# Patient Record
Sex: FEMALE | Race: WHITE | Employment: OTHER | URBAN - METROPOLITAN AREA
[De-identification: names, ages, dates, MRNs, and addresses within clinical notes are randomized per-mention and may not be internally consistent; named-entity substitution may affect disease eponyms.]

---

## 2017-09-07 ENCOUNTER — OP HISTORICAL/CONVERTED ENCOUNTER (OUTPATIENT)
Dept: OTHER | Age: 62
End: 2017-09-07

## 2018-10-26 ENCOUNTER — OP HISTORICAL/CONVERTED ENCOUNTER (OUTPATIENT)
Dept: OTHER | Age: 63
End: 2018-10-26

## 2019-10-28 ENCOUNTER — OP HISTORICAL/CONVERTED ENCOUNTER (OUTPATIENT)
Dept: OTHER | Age: 64
End: 2019-10-28

## 2020-06-03 ENCOUNTER — TELEPHONE (OUTPATIENT)
Dept: FAMILY MEDICINE CLINIC | Age: 65
End: 2020-06-03

## 2020-06-03 NOTE — TELEPHONE ENCOUNTER
----- Message from Nancy Tierney sent at 6/3/2020 12:02 PM EDT -----  Regarding: Pat/Telephone  General Message/Vendor Calls    Caller's first and last name:Camila Lewis      Reason for call:tick bite in her forearm      Callback required yes/no and why:yes      Best contact number(s):[phone]      Details to clarify the request:Patient used to see Dr. Michael Moreno but it has been more than 3 years and she has a tick bite she wants to know if she can do the virtual visit instead for a New Patient as soon as possible      Nancy Tierney

## 2020-06-03 NOTE — TELEPHONE ENCOUNTER
Received staff message. Called to schedule next NP appt in office b/c NP can not do VV. Kept calling number but it would not ring and no voicemail.

## 2020-08-20 ENCOUNTER — OP HISTORICAL/CONVERTED ENCOUNTER (OUTPATIENT)
Dept: OTHER | Age: 65
End: 2020-08-20

## 2020-09-21 ENCOUNTER — OFFICE VISIT (OUTPATIENT)
Dept: SURGERY | Age: 65
End: 2020-09-21
Payer: MEDICARE

## 2020-09-21 VITALS
HEART RATE: 80 BPM | TEMPERATURE: 96.9 F | HEIGHT: 68 IN | WEIGHT: 193 LBS | OXYGEN SATURATION: 98 % | BODY MASS INDEX: 29.25 KG/M2 | SYSTOLIC BLOOD PRESSURE: 135 MMHG | DIASTOLIC BLOOD PRESSURE: 99 MMHG

## 2020-09-21 DIAGNOSIS — I80.01 PHLEBITIS AND THOMBOPHLB OF SUPERFIC VESSELS OF R LOW EXTREM: Primary | ICD-10-CM

## 2020-09-21 PROCEDURE — 99203 OFFICE O/P NEW LOW 30 MIN: CPT | Performed by: SURGERY

## 2020-09-21 RX ORDER — LEVOTHYROXINE SODIUM 100 UG/1
TABLET ORAL
COMMUNITY
Start: 2020-08-14

## 2020-09-21 NOTE — PROGRESS NOTES
VASCULAR FOLLOW UP      Subjective:   CHIEF COMPLAINTS:  Thrombophlebitis of right leg. PRESENTATION OF ILLNESS:  Melanie Daniel is a very pleasant 77years old with a .  She is otherwise very active. She noticed swelling and blood clots from ruptured vein from the right leg since August 2020. Pain is minimal.  However she noticed a hard knot at below the knee area. She is here for consultation. Patient wears a compression stocking but she is not sure what compression measurement she has. Patient denies any fever chills. Patient recently underwent ultrasonogram venous system which showed no deep vein thrombosis. Patient has been applying warm salt compresses as well. Past Medical History:   Diagnosis Date    Cancer [facility]) 1999    thyroid    Osteopenia     Thyroid disease     Varicose vein of leg       Past Surgical History:   Procedure Laterality Date    HX CATARACT REMOVAL  2007    HX COLONOSCOPY      HX THYROIDECTOMY  1999     Family History   Problem Relation Age of Onset    Cancer Mother         breast    Thyroid Disease Mother     Heart Disease Father       Social History     Tobacco Use    Smoking status: Never Smoker    Smokeless tobacco: Never Used   Substance Use Topics    Alcohol use: Yes     Alcohol/week: 1.0 standard drinks     Types: 1 Glasses of wine per week     Frequency: Monthly or less     Drinks per session: 1 or 2     Binge frequency: Never       Prior to Admission medications    Medication Sig Start Date End Date Taking?  Authorizing Provider   LevoxyL 100 mcg tablet TAKE 1 TABLET BY MOUTH ONCE DAILY IN THE MORNING ON AN EMPTY STOMACH FOR 90 DAYS 8/14/20   Provider, Historical     No Known Allergies     Review of Systems:  I reviewed the rest of organ systems personally and they were negative signed by Dr. Lucina Samaniego    Objective:     Visit Vitals  BP (!) 135/99 (BP 1 Location: Right arm, BP Patient Position: Sitting)   Pulse 80   Temp 96.9 °F (36.1 °C)   Ht 5' 8\" (1.727 m)   Wt 193 lb (87.5 kg)   SpO2 98%   BMI 29.35 kg/m²     VITAL SIGNS REVIEWED. Physical Exam:  Patient is well-nourished pleasant in conversation is appropriate. Head and neck examination atraumatic, normocephalic. Gaze appropriate. Conversation appropriate. Neck examination shows supple. No mass. No obvious carotid bruit. Chest examination shows lungs are clear bilaterally well-expanded, no crackles or wheezes. Cardiovascular system regular rate, no obvious murmur. Skin warm to touch  and moist, no skin lesions. Abdomen is soft ,not tender or distended bowel sounds present. No palpable mass. Neurological examinations, no focal neuro deficits moving all 4 extremities. Cranial nerves intact. Sensation is intact as well. Hematologic: No obvious bruise or swelling or obvious lymphadenopathy. Psychosocial: Appropriate. Has good effect. Musculoskeletal system: No muscle wasting, appropriate movements upper and lower extremity. Vascular examination: Patient has a varicose vein worse on the right leg and left side. Swelling is moderate. She has a CEAP C3 classification. Data Review:   No visits with results within 1 Month(s) from this visit. Latest known visit with results is:   Abstract on 04/02/2010   Component Date Value Ref Range Status    Mammography, External 02/01/2010 NORMAL   Final        Assessment:     Problem List Items Addressed This Visit        Circulatory    Phlebitis and thombophlb of superfic vessels of r low extrem - Primary              Plan:     I discussed with the patient details about phlebitis and thrombophlebitis of the legs. We talked about her stage of vein conditions CEAP C3 classification. We talked about conservative management of the phlebitis including warm soak compress therapy, ibuprofen therapy, also appropriate fitting compression stocking with a pressure system of 20 to 30 mmHg.   Patient will go to lymphedema clinic and Alicia Perea have this stocking fitted and custom-made. And I would like to see her again follow-up in 3 months.         Keily Reyes MD

## 2020-10-23 ENCOUNTER — TRANSCRIBE ORDER (OUTPATIENT)
Dept: SCHEDULING | Age: 65
End: 2020-10-23

## 2020-10-23 DIAGNOSIS — Z12.31 SCREENING MAMMOGRAM FOR HIGH-RISK PATIENT: Primary | ICD-10-CM

## 2020-11-19 ENCOUNTER — HOSPITAL ENCOUNTER (OUTPATIENT)
Dept: MAMMOGRAPHY | Age: 65
Discharge: HOME | End: 2020-11-19
Attending: FAMILY MEDICINE
Payer: MEDICARE

## 2020-11-19 DIAGNOSIS — Z12.31 SCREENING MAMMOGRAM FOR HIGH-RISK PATIENT: ICD-10-CM

## 2020-11-19 PROCEDURE — 77067 SCR MAMMO BI INCL CAD: CPT

## 2020-12-01 ENCOUNTER — HOSPITAL ENCOUNTER (OUTPATIENT)
Dept: PHYSICAL THERAPY | Age: 65
Discharge: HOME | End: 2020-12-01
Payer: MEDICARE

## 2020-12-01 PROCEDURE — 97535 SELF CARE MNGMENT TRAINING: CPT

## 2020-12-01 PROCEDURE — 97140 MANUAL THERAPY 1/> REGIONS: CPT

## 2020-12-01 PROCEDURE — 97161 PT EVAL LOW COMPLEX 20 MIN: CPT

## 2020-12-01 NOTE — PROGRESS NOTES
[address] [facility]  [address] [facility]  [address]      INITIAL EVALUATION    NAME: Shara Oreilly AGE: 77 y.o. GENDER: female  DATE: 12/1/2020  REFERRING PHYSICIAN: Ge Price MD  HISTORY AND BACKGROUND:  Patient referred to clinic for evaluation and treatment of LE lymphedema with varicose veins R LE. Patient reports family history of LE swelling, including her mother and brother. Patient reports her R LE has been larger than L LE most of her life. Patient reports \"knot\" developed anterior lower leg right, which appears to have resolved. Patient reports doppler study performed, r/o DVT. See further medical history as noted below. Primary Diagnosis:  · Primary lymphedema (Q82.0)  Other Treatment Diagnoses:   Swelling not relieved by elevation ( R60.9 edema)  Morbid Obesity (E66.01)  Date of Onset: early adulthood, thrombophlebitis anterior lower leg right? 8/2020  Present Symptoms and Functional Limitations: R LE larger than L LE, mild negative impact on body image, socializing with others, understanding of how to manage lymphedema. Lymphedema Life Impact Scale: 4/68  Past Medical History:   Past Medical History:   Diagnosis Date    Cancer [facility]) 1999    thyroid    Osteopenia     Thyroid disease     Varicose vein of leg      Past Surgical History:   Procedure Laterality Date    HX CATARACT REMOVAL  2007    HX COLONOSCOPY      HX THYROIDECTOMY  1999     Current Medications:    Current Outpatient Medications   Medication Sig    LevoxyL 100 mcg tablet TAKE 1 TABLET BY MOUTH ONCE DAILY IN THE MORNING ON AN EMPTY STOMACH FOR 90 DAYS     No current facility-administered medications for this encounter.       Allergies: No Known Allergies   Prior Level of Function/Social/Work History/Personal factors and/or comorbidities impacting plan of care: Patient reports long history of R LE being larger than L LE, however, developed \"knot\" anterior lower leg 8/2020, which has resolved. Retired principal.  Lives 1 hour from clinic. Thyroidectomy for management of thyroid cancer. Patient rides and cares for horses. Living Situation: private residence      Trainable Caregiver?: spouse has COPD. Patient will need to be independent with management of lymphedema. Self-care/ADLs: indep     Mobility: indep   Sleeping Arrangement:  bed   Adaptive Equipment Owned: na  Previous Therapy:  Limited wear of OTC compression sleeve R LE, footless. Compression/Lymphedema Equipment:  Footless OTC compression sleeve. SUBJECTIVE:   Patient complains of R LE being larger than L LE for most of her life. States she developed a knot under the skin R anterior lower leg, which has resolved. However, understands she needs to have compression stocking ordered and fit R LE. Patient reports family history of lymphedema, mother and brother. Patient is active, riding and caring for horses.    Patients goals for therapy: reduce size of R LE.    OBJECTIVE DATA SUMMARY:   EXAMINATION/PRESENTATION/DECISION MAKING:   Pain: 0/10             Skin and Tissue Assessment:  Dermal Status:LE  [x]   Intact [x]  Dry   []  Tenuous [] Flaky   []  Wound/lesion present []  Scars   []  Dermatitis    Texture/Consistency:  [x]  Boggy: bilateral LE, R>L []  Pitting Edema   []  Brawny []  Combination   [x]  Fibrotic/Woody: medial distal lower leg    Pigmentation/Color Change:  []  Normal []  Hemosiderin   []  Red []  Erythematous   []  Hyperpigmented []  Hyperlipodermatosclerosis   Anomalies: na  []  Lymphorrhea []  Vesicles   []  Petechiae []  Warty Vercusis   []  Bullae []  Papilloma   Circulatory:  []  BHAVNA [x]  Varicosities: R LE   [x]  Pulses: dorsal pedal pulses palpable []  Vascular studies ruled out DVT in past   []  DVT History    Nails:  [x]   Normal  []   Fungus  Stemmers Sign: normal  Photos:    Height:   5'7\"  Weight:    190 lbs   BMI:   29.8  (36 or greater: adversely affecting lymphedema)  Volumetric Measurements:   Right:  9493.71 mL Left:  9111.77 mL   % Difference: 4.19%    (See scanned graph)  Range of Motion: bilateral LE WNL AROM. Strength: bilateral LE 5/5 with MMT. Sensation:  intact  Mobility:  Bed/Chair Mobility:  Independent  Transfers:  Independent    Sitting Balance:  good Standing Balance:  good   Gait:  Independent  Wheelchair Mobility:  (Other) na   Endurance:  Good, patient active, gait community distances. Stairs: not assessed          Safety:  Patient is alert and oriented:  x4   Safety awareness:  intact   Fall Risk?:  low   Patient given written fall prevention handout: Yes   Precautions:  Standard lymphedema precautions to include avoiding blood pressure readings, injections and IVs or other procedures/acts that could lead to broken skin on affected area, and avoiding excessive heat, resistive activity or altitude without compression garment    Functional Measure:   LLIS      Physical Therapy Evaluation Charge Determination   History Examination Presentation Decision-Making   MEDIUM  Complexity : 1-2 comorbidities / personal factors will impact the outcome/ POC  MEDIUM Complexity : 3 Standardized tests and measures addressing body structure, function, activity limitation and / or participation in recreation  MEDIUM Complexity : Evolving with changing characteristics  Other outcome measures LLIS  LOW       Based on the above components, the patient evaluation is determined to be of the following complexity level: LOW     Evaluation Time: 8:40-8:58 am     18 minutes    TREATMENT PROVIDED:   1. Treatment description: The patient was educated regarding the role and function of the lymphatic system, pathophysiology of lymphedema, and instructed in the lymphedema management protocol of complete decongestive therapy (CDT).   This includes skin care to prevent breakdown or infection, lymphedema exercises, custom compression therapy options (bandaging, compression garments, compression pump, Augustine Nova, JoViPak, [employer] Aniket-Jesse, etc. as needed), and decongestion with manual lymphatic drainage as indicated. We discussed the need for consistent compression system for lymphedema management. Skin care education was performed,applying low pH lotion to extremity using upward strokes to stimulate lymphatic vessels. Treatment time:  8:58-9:24 am  Minutes: 26 minutes                Self care 2    2. Treatment description:  Measurements completed for custom flat knit compression stockings, Juzo 3022, knee high R LE. Patient agreeable with plan to order compression stockings through [facility], with patient to return to clinic for fit of compression stocking upon receiving. Treatment time:  9:24-9:40 am  Minutes: 16               Manual therapy 1    ASSESSMENT:   Celso Zapien is a 77 y.o. female who presents with stage II mild lymphedema R LE, varicosities significant. Left LE likely involved, however, plan to treat R LE at this time. Patient presents with 4.19% limb volume discrepancy, R LE> LE. Patient reports R LE \"has always been larger\" than L LE. Patient also reports family history of LE lymphedema, including her mother and brother. Plan to fit custom flat knit compression stocking R LE, educate patient regarding skin care, self MLD, and lymphedema specific exercise program upon effective fit of compression stocking R LE. This care is medically necessary due to the infection risk with lymphedema, and to improve functional activities. CDT is necessary to resolve swelling to allow patient to return to wearing normal clothes/footwear, and prevent worsening of symptoms, such as venous stasis ulcerations, infections, or hospitalizations.   Patient will be independent with home program strategies to allow improved ADL ability and mobility and to allow patient to return to greatest functional independence. Rehabilitation potential is considered to be Good. Factors which may influence rehabilitation potential include patient very active, with CCL 2 stocking recommended for optimal management of R LE lymphedema. Patient will benefit from 2-6 physical therapy visits over 12 weeks to optimize improvement in these areas. PLAN OF CARE:   Recommendations and Planned Interventions:  Manual lymph drainage/complete decongestive therapy  Multi-layer compression bandaging (short-stretch)  Compression garment fitting/provision  Lymphedema therapeutic exercise  AROM/PROM/Strength/Coordination  Self-care training  Functional mobility training  Education in skin care and lymphedema precautions  Self-MLD education per home program  Self-bandaging education per home program  Caregiver education as needed  Wound care as needed     GOALS  Short term goals  Time frame: 4-6 weeks    1. Patient to be independent with proper skin care to prevent future skin breakdown and decrease the potential risk for infections that are associated with Lymphedema. 1. Patient will be independent with a personal lymphedema exercise program to assist with the lymphatic flow and reduce limb volume by .  2. Patient will understand the signs and symptoms of acute infection. Long term goals  Time frame: 6-12 weeks  1. Patient will have knowledge of the compression options and acquire a safe and  appropriate daytime and night time compression system to prevent  re-accumulation of fluid. 2.  Patient or family will be able to don/doff garments independently. Garment  system effectiveness will be evaluated prior to discharge for better long-term  management and outcomes. 3.  Improvement in functional outcomes measure by 10% to allow for overall improvement in mobility with reduced pain. 4.   To transition patient to independent restorative phase of CDT.           Patient has participated in goal setting and agrees to work toward plan of care.  Patient was instructed to call if questions or concerns arise. Thank you for this referral.  Sil German PT, [facility]   Time Calculation: 60 mins    TREATMENT PLAN EFFECTIVE DATES:   12/1/2020 TO 2/28/2021  I have read the above plan of care for Malgorzata Quiñones. I certify the above prescribed services are required by this patient and are medically necessary.   The above plan of care has been developed in conjunction with the lymphedema/physical therapist.       Physician Signature: ____________________________________Date:______________

## 2020-12-17 ENCOUNTER — HOSPITAL ENCOUNTER (OUTPATIENT)
Dept: PHYSICAL THERAPY | Age: 65
Discharge: HOME | End: 2020-12-17
Payer: MEDICARE

## 2020-12-17 PROCEDURE — 97760 ORTHOTIC MGMT&TRAING 1ST ENC: CPT

## 2020-12-17 PROCEDURE — 97110 THERAPEUTIC EXERCISES: CPT

## 2020-12-17 NOTE — PROGRESS NOTES
[facility]  Lymphedema Clinic and Cancer Rehabilitation  [address] [place] [address]  [address]  [place]        LYmphedema Therapy  Visit: 2    [x]                  Daily note               []                 30 day/10th visit progress note    NAME: Jeffrey Gray  DATE: 12/17/2020    GOALS  Short term goals  Time frame: 4-6 weeks     1. Patient to be independent with proper skin care to prevent future skin breakdown and decrease the potential risk for infections that are associated with Lymphedema. 2.Patient will be independent with a personal lymphedema exercise program to assist with the lymphatic flow. 3.Patient will understand the signs and symptoms of acute infection. 12/17/2020 educated and provided written s/s to patient. Long term goals  Time frame: 6-12 weeks  1. Patient will have knowledge of the compression options and acquire a safe and       appropriate daytime and night time compression system to prevent             re-accumulation of fluid. 12/17/2020 goal met R LE.  2.  Patient or family will be able to don/doff garments independently. Garment   system effectiveness will be evaluated prior to discharge for better long-term  management and outcomes. 12/17/2020 Fit custom flat knit knee high compression stocking with good fit noted. Patient donned/doffed indep in clinic. 3.  Improvement in functional outcomes measure by 10% to allow for overall improvement in mobility with reduced pain. 4.   To transition patient to independent restorative phase of CDT. SUBJECTIVE REPORT:  Patient arrives stating she has received her custom flat knit compression stocking for R LE. Agreeable to proceeding with fit of stocking   Pain: 0/10                                Gait:   -  Independent gait without any assistive device for community distances  ADLs:  indep  Treatment Response:  Good fit of R LE knee high compression stocking.   [x] Patient reviewed packet received at evaluation  [x]   Patient completed home program as prescribed  []   Patient not fully compliant with home program to date  []   Patient waiting on compression garment arrival  Function:   []   Patient requiring less assistance with completion of home program  []   ADLs are requiring less assistance   []   Patient able to return to work/leisure activities  TREATMENT AND OBJECTIVE DATA SUMMARY:   Patient/Family Education:      Educated in skin care: [x]   Skin care products  [x]   Hygiene  [x]  Prevention of cellulitis  []   Wound care     Educated in exercise: []   Walking program  []   Vangie ball routine  []   Stick routine  [x]   ROM routine     Instructed in self MLD:   Written sequence given and reviewed with patient as well as demonstration and instruction during MLD portion of the session. Instructed in don/doff of compression system:   []   Multi layer bandage (MLB) donning principles and wear precautions  [x]   Day garments  []   Night garments       Therapeutic Exercise/Procedure: 9:05-9:15 am 10 minutes             Lymphedema specific exercise: Patient performs the following with assist of therapist, 10 reps with compression stocking on:  1. Toe flex/ext  2. Toe abd/add  3. Ankle pumps  4. Ankle circumduction both directions  5. Toe raises supported at counter top    Patient provided written home exercise program as noted above, continuing 10 reps, 3x/day with compression in place R LE. Patient verbalizes good understanding.    Home program: Patient to perform daily to BID:  [x]   Skin care  [x]   Deep abdominal breathing  [x]   Exercise routine  []   Walking program  []   Rest in supine   []   Compression bandage  [x]   Compression garments  []   Vasopneumatic device  []   Wound care  []   Self MLD  [x]   Bring supplies to each therapy visit  []   Purchase necessary supplies  []   Weight loss program  []   Follow up with       Rationale: Exercise will increase the lymph angiomotoricity and tissue pressure of the skin and thus decrease swelling. Orthotic management: 8:45-9:04 am 19 minutes     Area to decongest:    [] UE          []  Right     []  Left      [] Trunk      [x] LE             [x]  Right     []  Left      [] Trunk         Sequence used and effectiveness: [] Secondary/Primary sequence for upper extremity with / without trunk involvement    [] Secondary/Primary sequence for lower extremities with / without trunk involvement     [] Modifications were made to manual lymph drainage sequence to exclude cervical techniques secondary to patient's age    [x] Self MLD sequence/techniques/hand strokes   Skin/wound care/debridement: Skin intact. Lower extremity compression: Therapist fit R LE compression stocking, Pratik 3022 knee high , CT, SB. Note good fit of stocking, with patient instructed in don/doff of garment. Patient able to doff/don stocking in clinic. Advised  gloves to be used to don/doff of stocking to avoid picking/running fabric. Patient provided with the following home management:    Compression garment routine:  1. Apply daytime compression stocking to clean, dry extremity first thing in the morning, EVERY MORNING. Progressive wear schedule may be indicated based on tolerance. If needed, begin wear of stocking 2 hours today, adding one hour each day until tolerating all day wear. 2. Wear compression garments until bedtime, adjusting throughout the day as needed should garment wrinkle or crease. Problem areas can be at joints, and top of garment, ensuring proximal aspect of garment positioned appropriately on extremity. 3. Launder garment nightly either by hand or washing machine in a garment bag or pillowcase. Use mild detergent with NO FABRIC SOFTENER, NO BLEACH, NO WOOLITE. Wash in cool/warm water. 4. Dry garment in dryer on low heat right side out in garment bag or pillowcase.   5. Perform skin care to extremity, cleansing skin daily with soap and water, and using low pH lotion, upward strokes to stimulate lymphatic vessels. 6. Daytime compression grments are NOT safe to sleep in, so remove at bedtime. 7. Perform exercise program with compression garments on. Signs/Symptoms of infection include:  Fever, flu-like symptoms, pain/redness/warmthin extremity, sometimes with increase in swelling present. Stop wearing your compression garment if this occurs. Call your doctor immediately or go to the Emergency room to address potential infection. Remove compression with changes in circulation, numbness in extremity different from what you may experience when not wearing compression garment, pain, unexplained shortness of breath. Notify clinic if swelling increase noted prior to next scheduled appt. Night time compression may be needed for optimal management of lymphedema. Return in 2-4  weeks for follow up appt. Kinesiotaping: na   Girth/Volume measurement: Girth measurements as noted below. TOTAL TREATMENT 29 mins     Circumferential Limb Measurements:  Affected Side: Bilateral   Left (cm) Right (cm)   5th Tuberosity 23    23.3      Ankle 23.5    23.7      Calf 40.4    42      Mid Knee             Thigh             Groin             Length                 ASSESSMENT:   Treatment effectiveness and tolerance: Note good fit of compression stocking in clinic, with patient verbalizing and demonstrating good understanding regarding wear/care and don/doff of stocking. Patient returns demonstrating of exercises performs in clinic, with written program provided. Skin intact. Progress toward goals: See above goals. PLAN OF CARE:   Changes to the plan of care: 1. Patient to continue skin care/self MLD nightly. 2. Patient to follow above instructions regarding compression stocking wear. 3.  Patient to return in 2-4 weeks   4. Patient to notify clinic of any concerns/questions prior to next scheduled appt.    Frequency: []  2 times a week  []  Weekly  [x]  Biweekly  [x]  Monthly           Gemini Pennington, PT, [facility]

## 2021-01-07 ENCOUNTER — HOSPITAL ENCOUNTER (OUTPATIENT)
Dept: PHYSICAL THERAPY | Age: 66
Discharge: HOME | End: 2021-01-07
Payer: MEDICARE

## 2021-01-07 PROCEDURE — 97116 GAIT TRAINING THERAPY: CPT

## 2021-01-07 NOTE — PROGRESS NOTES
[facility]  Lymphedema Clinic and Cancer Rehabilitation  [address] [place] [address]  [address]  [place] [address]        LYmphedema Therapy  Visit: 3    [x]                  Daily note               [x]                 Discharge    NAME: Connie Chacon  DATE: 1/7/2021    GOALS  Short term goals  Time frame: 4-6 weeks     1. Patient to be independent with proper skin care to prevent future skin breakdown and decrease the potential risk for infections that are associated with Lymphedema. 1/7/2021 goal met  2. Patient will be independent with a personal lymphedema exercise program to assist with the lymphatic flow. 1/7/2021 goal met  3. Patient will understand the signs and symptoms of acute infection. 12/17/2020 educated and provided written s/s to patient. 1/7/2021 goal met. Long term goals  Time frame: 6-12 weeks  1. Patient will have knowledge of the compression options and acquire a safe and       appropriate daytime and night time compression system to prevent             re-accumulation of fluid. 12/17/2020 goal met R LE.  2.  Patient or family will be able to don/doff garments independently. Garment   system effectiveness will be evaluated prior to discharge for better long-term  management and outcomes. 12/17/2020 Fit custom flat knit knee high compression stocking with good fit noted. Patient donned/doffed indep in clinic. 1/7/2021 goal met. 3.  Improvement in functional outcomes measure by 10% to allow for overall improvement in mobility with reduced pain. 1/7/2021 goal deferred. 4.   To transition patient to independent restorative phase of CDT. 1/7/2021 goal met       SUBJECTIVE REPORT:  Patient arrives stating she has received her custom flat knit compression stocking for R LE. State she is wearing stocking daily, morning until evening. Reports tolerating compression garment wear well.   Expresses that she wants to continue with knee high compression stocking on R LE only at this time. Pain: 0/10                                Gait:   -  Independent gait without any assistive device for community distances  ADLs:  indep  Treatment Response:  Good fit of R LE knee high compression stocking. [x]   Patient reviewed packet received at evaluation  [x]   Patient completed home program as prescribed  []   Patient not fully compliant with home program to date  []   Patient waiting on compression garment arrival  Function:   []   Patient requiring less assistance with completion of home program  []   ADLs are requiring less assistance   []   Patient able to return to work/leisure activities  LLIS: 5/68; 7% impairment. Consistent with outcomes measure at time of initial evaluation. TREATMENT AND OBJECTIVE DATA SUMMARY:   Patient/Family Education:      Educated in skin care: [x]   Skin care products  [x]   Hygiene  [x]  Prevention of cellulitis  []   Wound care     Educated in exercise: []   Walking program  []   Vangie ball routine  []   Stick routine  [x]   ROM routine     Instructed in self MLD:   Written sequence given and reviewed with patient as well as demonstration and instruction during MLD portion of the session. Instructed in don/doff of compression system:   []   Multi layer bandage (MLB) donning principles and wear precautions  [x]   Day garments  []   Night garments       Therapeutic Exercise/Procedure:               Lymphedema specific exercise: Patient to continue the following exercise, 10 reps with compression stocking on:  1. Toe flex/ext  2. Toe abd/add  3. Ankle pumps  4. Ankle circumduction both directions  5. Toe raises supported at counter top    Patient provided written home exercise program as noted above, continuing 10 reps, 3x/day with compression in place R LE. Patient verbalizes good understanding.    Home program: Patient to perform daily to BID:  [x]   Skin care  [x]   Deep abdominal breathing  [x]   Exercise routine  [] Walking program  []   Rest in supine   []   Compression bandage  [x]   Compression garments  []   Vasopneumatic device  []   Wound care  []   Self MLD  [x]   Bring supplies to each therapy visit  []   Purchase necessary supplies  []   Weight loss program  []   Follow up with       Rationale: Exercise will increase the lymph angiomotoricity and tissue pressure of the skin and thus decrease swelling. Manual therapy: 11:15-11:36 am 21 minutes     Area to decongest:    [] UE          []  Right     []  Left      [] Trunk      [x] LE             [x]  Right     []  Left      [] Trunk         Sequence used and effectiveness: [] Secondary/Primary sequence for upper extremity with / without trunk involvement    [] Secondary/Primary sequence for lower extremities with / without trunk involvement     [] Modifications were made to manual lymph drainage sequence to exclude cervical techniques secondary to patient's age    [x] Self MLD sequence/techniques/hand strokes   Skin/wound care/debridement: Skin intact. Lower extremity compression: Therapist reassessed fit R LE compression stocking, Juzo 3022 knee high , CT, SB. Note good fit of stocking, with patient instructed in don/doff of garment. Patient able to doff/don stocking in clinic. Advised  gloves to be used to don/doff of stocking to avoid picking/running fabric. Patient to continue with the following home management:    Compression garment routine:  1. Apply daytime compression stocking to clean, dry extremity first thing in the morning, EVERY MORNING. Progressive wear schedule may be indicated based on tolerance. If needed, begin wear of stocking 2 hours today, adding one hour each day until tolerating all day wear. 2. Wear compression garments until bedtime, adjusting throughout the day as needed should garment wrinkle or crease.   Problem areas can be at joints, and top of garment, ensuring proximal aspect of garment positioned appropriately on extremity. 3. Launder garment nightly either by hand or washing machine in a garment bag or pillowcase. Use mild detergent with NO FABRIC SOFTENER, NO BLEACH, NO WOOLITE. Wash in cool/warm water. 4. Dry garment in dryer on low heat right side out in garment bag or pillowcase. 5. Perform skin care to extremity, cleansing skin daily with soap and water, and using low pH lotion, upward strokes to stimulate lymphatic vessels. 6. Daytime compression grments are NOT safe to sleep in, so remove at bedtime. 7. Perform exercise program with compression garments on. Signs/Symptoms of infection include:  Fever, flu-like symptoms, pain/redness/warmthin extremity, sometimes with increase in swelling present. Stop wearing your compression garment if this occurs. Call your doctor immediately or go to the Emergency room to address potential infection. Remove compression with changes in circulation, numbness in extremity different from what you may experience when not wearing compression garment, pain, unexplained shortness of breath. Notify clinic if swelling increase noted prior to next scheduled appt. Night time compression may be needed for optimal management of lymphedema. Return in 2-4  weeks for follow up appt. Kinesiotaping: na   Girth/Volume measurement: Repeated limb volume measurements with R LE limb volume reduced by 308 mL, with limb volume discrepancy under 1%, which is WNL. See scanned graph. TOTAL TREATMENT 21 mins     ASSESSMENT:   Treatment effectiveness and tolerance: Note good fit of compression stocking continues, with patient verbalizing and demonstrating good understanding regarding wear/care and don/doff of stocking. Patient indep in HEP. Patient understands thigh high compression stocking would be beneficial, with mild varicosities noted above the knee. Patient states she would like to wear knee high compression stocking on R LE only.   Agreeable to reassess bilateral LE lymphedema at 6 months follow up appt. Progress toward goals: See above goals. PLAN OF CARE:   Changes to the plan of care: Patient to continue with home management phase of lymphedema, as noted in above instructions, provided verbally and in writing. Patient to notify clinic of any questions or concerns prior to next scheduled appt. Frequency: Discharge at this time. Patient to return for 6 month follow up.            Bernadette Mejia, PT, [facility]

## 2021-01-21 ENCOUNTER — OFFICE VISIT (OUTPATIENT)
Dept: SURGERY | Age: 66
End: 2021-01-21
Payer: MEDICARE

## 2021-01-21 VITALS
SYSTOLIC BLOOD PRESSURE: 149 MMHG | HEIGHT: 68 IN | DIASTOLIC BLOOD PRESSURE: 94 MMHG | TEMPERATURE: 97.7 F | HEART RATE: 68 BPM | BODY MASS INDEX: 28.67 KG/M2 | OXYGEN SATURATION: 95 % | WEIGHT: 189.2 LBS

## 2021-01-21 DIAGNOSIS — I80.01 PHLEBITIS AND THOMBOPHLB OF SUPERFIC VESSELS OF R LOW EXTREM: Primary | ICD-10-CM

## 2021-01-21 PROCEDURE — 1090F PRES/ABSN URINE INCON ASSESS: CPT | Performed by: SURGERY

## 2021-01-21 PROCEDURE — G8432 DEP SCR NOT DOC, RNG: HCPCS | Performed by: SURGERY

## 2021-01-21 PROCEDURE — G9899 SCRN MAM PERF RSLTS DOC: HCPCS | Performed by: SURGERY

## 2021-01-21 PROCEDURE — G8536 NO DOC ELDER MAL SCRN: HCPCS | Performed by: SURGERY

## 2021-01-21 PROCEDURE — 99213 OFFICE O/P EST LOW 20 MIN: CPT | Performed by: SURGERY

## 2021-01-21 PROCEDURE — 3017F COLORECTAL CA SCREEN DOC REV: CPT | Performed by: SURGERY

## 2021-01-21 PROCEDURE — G8400 PT W/DXA NO RESULTS DOC: HCPCS | Performed by: SURGERY

## 2021-01-21 PROCEDURE — G8427 DOCREV CUR MEDS BY ELIG CLIN: HCPCS | Performed by: SURGERY

## 2021-01-21 PROCEDURE — G8419 CALC BMI OUT NRM PARAM NOF/U: HCPCS | Performed by: SURGERY

## 2021-01-21 PROCEDURE — 1101F PT FALLS ASSESS-DOCD LE1/YR: CPT | Performed by: SURGERY

## 2021-01-21 RX ORDER — CALCIUM CARBONATE 600 MG
600 TABLET ORAL DAILY
COMMUNITY

## 2021-01-21 RX ORDER — CHOLECALCIFEROL TAB 125 MCG (5000 UNIT) 125 MCG
TAB ORAL
COMMUNITY

## 2021-01-21 NOTE — PROGRESS NOTES
VASCULAR FOLLOW UP      Subjective:   CHIEF COMPLAINTS:  Phlebitis follow-up. PRESENTATION OF ILLNESS:  Ms. Kristen Howard is here today for 5-month follow-up for the management of the phlebitis right leg and swelling. Patient has a have complication from varicose veins and swelling. Patient was recommended conservative management for phlebitis treatment. Patient was also instructed to get a custom-made compression stocking below knee level. Patient was able to go to lymphedema clinic at [facility].  She was able to feel it and she finally got it about 3 weeks ago and she been wearing compression stocking since then. Patient claims there is normal hard painful spot on the right leg. And swelling is improved. Past Medical History:   Diagnosis Date    Cancer [facility]) 1999    thyroid    Osteopenia     Thyroid disease     Varicose vein of leg       Past Surgical History:   Procedure Laterality Date    HX CATARACT REMOVAL  2007    HX COLONOSCOPY      HX THYROIDECTOMY  1999     Family History   Problem Relation Age of Onset    Thyroid Disease Mother     Breast Cancer Mother     Heart Disease Father     Breast Cancer Niece       Social History     Tobacco Use    Smoking status: Never Smoker    Smokeless tobacco: Never Used   Substance Use Topics    Alcohol use: Yes     Alcohol/week: 1.0 standard drinks     Types: 1 Glasses of wine per week     Frequency: Monthly or less     Drinks per session: 1 or 2     Binge frequency: Never       Prior to Admission medications    Medication Sig Start Date End Date Taking? Authorizing Provider   cholecalciferol (VITAMIN D3) (5000 Units/125 mcg) tab tablet 1 tablet   Yes Provider, Historical   calcium carbonate (CALTREX) 600 mg calcium (1,500 mg) tablet Take 600 mg by mouth daily.    Yes Provider, Historical   LevoxyL 100 mcg tablet TAKE 1 TABLET BY MOUTH ONCE DAILY IN THE MORNING ON AN EMPTY STOMACH FOR 90 DAYS 8/14/20  Yes Provider, Historical     No Known Allergies     Review of Systems:  I reviewed the rest of organ systems personally and they were negative signed by Dr. Amelia Schroeder    Objective:     Visit Vitals  BP (!) 149/94 (BP 1 Location: Left arm, BP Patient Position: Sitting)   Pulse 68   Temp 97.7 °F (36.5 °C) (Temporal)   Ht 5' 8\" (1.727 m)   Wt 85.8 kg (189 lb 3.2 oz)   SpO2 95%   BMI 28.77 kg/m²     VITAL SIGNS REVIEWED. Physical Exam:  Patient is well-nourished pleasant in conversation is appropriate. Head and neck examination atraumatic, normocephalic. Gaze appropriate. Conversation appropriate. Neck examination shows supple. No mass. No obvious carotid bruit. Chest examination shows lungs are clear bilaterally well-expanded, no crackles or wheezes. Cardiovascular system regular rate, no obvious murmur. Skin warm to touch  and moist, no skin lesions. Abdomen is soft ,not tender or distended bowel sounds present. No palpable mass. Neurological examinations, no focal neuro deficits moving all 4 extremities. Cranial nerves intact. Sensation is intact as well. Hematologic: No obvious bruise or swelling or obvious lymphadenopathy. Psychosocial: Appropriate. Has good effect. Musculoskeletal system: No muscle wasting, appropriate movements upper and lower extremity. Vascular examination: Patient is does not have any more phlebitis symptoms or signs such as redness painful spot on the right leg. However varicosities has not changed. Swelling is improved. I examined the patient's compression stockings custom-made is well designed. Data Review:   No visits with results within 1 Month(s) from this visit. Latest known visit with results is:   Abstract on 04/02/2010   Component Date Value Ref Range Status    Mammography, External 02/01/2010 NORMAL   Final        Assessment:     Problem List Items Addressed This Visit        Circulatory    Phlebitis and thombophlb of superfic vessels of r low extrem - Primary              Plan:      We will continue monitor her venous stasis complications especially right leg. Patient was encouraged to continue her compression stocking that she has custom-made ones on the right side. She is not currently wearing left leg compression stocking. She could not afford another $170 for custom-made compression stocking. So I suggested to her she can try to get a over-the-counter compression stocking for similar to the current pressure system. I would like to see her back my office a month follow-up patient was encouraged to continue participate in activities and exercises.         Alonso Soulier, MD

## 2021-07-06 ENCOUNTER — APPOINTMENT (OUTPATIENT)
Dept: PHYSICAL THERAPY | Age: 66
End: 2021-07-06

## 2021-09-27 ENCOUNTER — OFFICE VISIT (OUTPATIENT)
Dept: SURGERY | Age: 66
End: 2021-09-27
Payer: MEDICARE

## 2021-09-27 VITALS
SYSTOLIC BLOOD PRESSURE: 158 MMHG | WEIGHT: 178 LBS | HEART RATE: 57 BPM | TEMPERATURE: 98.6 F | DIASTOLIC BLOOD PRESSURE: 98 MMHG | BODY MASS INDEX: 26.98 KG/M2 | OXYGEN SATURATION: 95 % | HEIGHT: 68 IN

## 2021-09-27 DIAGNOSIS — I87.309 CHRONIC VENOUS HYPERTENSION, UNSPECIFIED LATERALITY: ICD-10-CM

## 2021-09-27 DIAGNOSIS — I80.01 PHLEBITIS AND THOMBOPHLB OF SUPERFIC VESSELS OF R LOW EXTREM: Primary | ICD-10-CM

## 2021-09-27 PROCEDURE — G8432 DEP SCR NOT DOC, RNG: HCPCS | Performed by: SURGERY

## 2021-09-27 PROCEDURE — G9899 SCRN MAM PERF RSLTS DOC: HCPCS | Performed by: SURGERY

## 2021-09-27 PROCEDURE — 1090F PRES/ABSN URINE INCON ASSESS: CPT | Performed by: SURGERY

## 2021-09-27 PROCEDURE — G8427 DOCREV CUR MEDS BY ELIG CLIN: HCPCS | Performed by: SURGERY

## 2021-09-27 PROCEDURE — 3017F COLORECTAL CA SCREEN DOC REV: CPT | Performed by: SURGERY

## 2021-09-27 PROCEDURE — G8536 NO DOC ELDER MAL SCRN: HCPCS | Performed by: SURGERY

## 2021-09-27 PROCEDURE — 1101F PT FALLS ASSESS-DOCD LE1/YR: CPT | Performed by: SURGERY

## 2021-09-27 PROCEDURE — G8419 CALC BMI OUT NRM PARAM NOF/U: HCPCS | Performed by: SURGERY

## 2021-09-27 PROCEDURE — 99213 OFFICE O/P EST LOW 20 MIN: CPT | Performed by: SURGERY

## 2021-09-27 PROCEDURE — G8400 PT W/DXA NO RESULTS DOC: HCPCS | Performed by: SURGERY

## 2021-09-27 NOTE — PROGRESS NOTES
Chief Complaint   Patient presents with    Follow-up     right leg     Visit Vitals  BP (!) 158/98 (BP 1 Location: Left arm, BP Patient Position: Sitting, BP Cuff Size: Adult)   Pulse (!) 57   Temp 98.6 °F (37 °C) (Temporal)   Ht 5' 8\" (1.727 m)   Wt 178 lb (80.7 kg)   SpO2 95%   BMI 27.06 kg/m²     Patient states her BP is always elevated at doctor appointments. 1. Have you been to the ER, urgent care clinic since your last visit? Hospitalized since your last visit? No    2. Have you seen or consulted any other health care providers outside of the [address] [place] [address] since your last visit? Include any pap smears or colon screening.  No

## 2021-09-29 PROBLEM — I87.309 CHRONIC VENOUS HYPERTENSION: Status: ACTIVE | Noted: 2021-09-29

## 2021-09-29 NOTE — PROGRESS NOTES
VASCULAR FOLLOW UP      Subjective:   CHIEF COMPLAINTS:  Varicose vein leg swelling. PRESENTATION OF ILLNESS:  Ms. Lux Lee is here today 6-month follow-up. She is very pleasant woman with a history of chronic venous hypertension with varicose vein. Her varicose vein is more symptomatic right leg. Occasional pain and swelling noted. Patient currently wearing compression stocking. Patient was able to get a custom made compression stocking. Patient says that it has been expensive for her. Patient denies chest pain shortness of breath or calf pain. Past Medical History:   Diagnosis Date    Cancer [facility]) 1999    thyroid    Osteopenia     Thyroid disease     Varicose vein of leg       Past Surgical History:   Procedure Laterality Date    HX CATARACT REMOVAL  2007    HX COLONOSCOPY      HX THYROIDECTOMY  1999     Family History   Problem Relation Age of Onset    Thyroid Disease Mother     Breast Cancer Mother     Heart Disease Father     Breast Cancer Niece       Social History     Tobacco Use    Smoking status: Never Smoker    Smokeless tobacco: Never Used   Substance Use Topics    Alcohol use: Yes     Alcohol/week: 1.0 standard drinks     Types: 1 Glasses of wine per week       Prior to Admission medications    Medication Sig Start Date End Date Taking? Authorizing Provider   cholecalciferol (VITAMIN D3) (5000 Units/125 mcg) tab tablet 1 tablet   Yes Provider, Historical   calcium carbonate (CALTREX) 600 mg calcium (1,500 mg) tablet Take 600 mg by mouth daily.    Yes Provider, Historical   LevoxyL 100 mcg tablet TAKE 1 TABLET BY MOUTH ONCE DAILY IN THE MORNING ON AN EMPTY STOMACH FOR 90 DAYS 8/14/20  Yes Provider, Historical     No Known Allergies     Review of Systems:  I reviewed the rest of organ systems personally and they were negative signed by Dr. Efrain Rosario    Objective:     Visit Vitals  BP (!) 158/98 (BP 1 Location: Left arm, BP Patient Position: Sitting, BP Cuff Size: Adult)   Pulse (!) 57 Temp 98.6 °F (37 °C) (Temporal)   Ht 5' 8\" (1.727 m)   Wt 178 lb (80.7 kg)   SpO2 95%   BMI 27.06 kg/m²     VITAL SIGNS REVIEWED. Physical Exam:  Patient is well-nourished pleasant in conversation is appropriate. Head and neck examination atraumatic, normocephalic. Gaze appropriate. Conversation appropriate. Neck examination shows supple. No mass. No obvious carotid bruit. Chest examination shows lungs are clear bilaterally well-expanded, no crackles or wheezes. Cardiovascular system regular rate, no obvious murmur. Skin warm to touch  and moist, no skin lesions. Abdomen is soft ,not tender or distended bowel sounds present. No palpable mass. Neurological examinations, no focal neuro deficits moving all 4 extremities. Cranial nerves intact. Sensation is intact as well. Hematologic: No obvious bruise or swelling or obvious lymphadenopathy. Psychosocial: Appropriate. Has good effect. Musculoskeletal system: No muscle wasting, appropriate movements upper and lower extremity. Vascular examination: Patient has CEAP C3 conditions with mild swelling with a significant varicose vein noted on the right side worse than left side. Data Review:   No visits with results within 1 Month(s) from this visit. Latest known visit with results is:   Abstract on 04/02/2010   Component Date Value Ref Range Status    Mammography, External 02/01/2010 NORMAL   Final        Assessment:     Problem List Items Addressed This Visit        Circulatory    Phlebitis and thombophlb of superfic vessels of r low extrem - Primary    Chronic venous hypertension              Plan: We also talked about the possibility local treatment of the varicose vein stripping with microphlebectomy for her symptoms. For now we will continue medical management for leg swelling and varicose vein issues with conservatively with compression stocking application. Her current compression stocking pressure gradient is 20 to 30 mmHg. Patient will be reevaluated in 6-month.         Magdalene Diaz MD

## 2021-12-08 ENCOUNTER — TRANSCRIBE ORDER (OUTPATIENT)
Dept: SCHEDULING | Age: 66
End: 2021-12-08

## 2021-12-08 DIAGNOSIS — Z12.31 VISIT FOR SCREENING MAMMOGRAM: Primary | ICD-10-CM

## 2021-12-16 ENCOUNTER — HOSPITAL ENCOUNTER (OUTPATIENT)
Dept: MAMMOGRAPHY | Age: 66
Discharge: HOME | End: 2021-12-16
Payer: MEDICARE

## 2021-12-16 DIAGNOSIS — Z12.31 VISIT FOR SCREENING MAMMOGRAM: ICD-10-CM

## 2021-12-16 PROCEDURE — 77063 BREAST TOMOSYNTHESIS BI: CPT

## 2022-03-19 PROBLEM — I80.01: Status: ACTIVE | Noted: 2020-09-21

## 2022-03-20 PROBLEM — I87.309 CHRONIC VENOUS HYPERTENSION: Status: ACTIVE | Noted: 2021-09-29

## 2022-03-28 ENCOUNTER — OFFICE VISIT (OUTPATIENT)
Dept: SURGERY | Age: 67
End: 2022-03-28
Payer: MEDICARE

## 2022-03-28 VITALS
TEMPERATURE: 98 F | DIASTOLIC BLOOD PRESSURE: 98 MMHG | RESPIRATION RATE: 20 BRPM | OXYGEN SATURATION: 96 % | HEART RATE: 60 BPM | WEIGHT: 182.8 LBS | HEIGHT: 68 IN | BODY MASS INDEX: 27.71 KG/M2 | SYSTOLIC BLOOD PRESSURE: 159 MMHG

## 2022-03-28 DIAGNOSIS — I87.309 CHRONIC VENOUS HYPERTENSION, UNSPECIFIED LATERALITY: ICD-10-CM

## 2022-03-28 DIAGNOSIS — I83.90 VARICOSE VEINS OF LOWER EXTREMITY, UNSPECIFIED LATERALITY, UNSPECIFIED WHETHER COMPLICATED: Primary | ICD-10-CM

## 2022-03-28 PROCEDURE — 3017F COLORECTAL CA SCREEN DOC REV: CPT | Performed by: SURGERY

## 2022-03-28 PROCEDURE — 1101F PT FALLS ASSESS-DOCD LE1/YR: CPT | Performed by: SURGERY

## 2022-03-28 PROCEDURE — G8536 NO DOC ELDER MAL SCRN: HCPCS | Performed by: SURGERY

## 2022-03-28 PROCEDURE — G9899 SCRN MAM PERF RSLTS DOC: HCPCS | Performed by: SURGERY

## 2022-03-28 PROCEDURE — 1090F PRES/ABSN URINE INCON ASSESS: CPT | Performed by: SURGERY

## 2022-03-28 PROCEDURE — G8427 DOCREV CUR MEDS BY ELIG CLIN: HCPCS | Performed by: SURGERY

## 2022-03-28 PROCEDURE — G8510 SCR DEP NEG, NO PLAN REQD: HCPCS | Performed by: SURGERY

## 2022-03-28 PROCEDURE — G8419 CALC BMI OUT NRM PARAM NOF/U: HCPCS | Performed by: SURGERY

## 2022-03-28 PROCEDURE — 99213 OFFICE O/P EST LOW 20 MIN: CPT | Performed by: SURGERY

## 2022-03-28 PROCEDURE — G8400 PT W/DXA NO RESULTS DOC: HCPCS | Performed by: SURGERY

## 2022-03-28 NOTE — PROGRESS NOTES
1. Have you been to the ER, urgent care clinic since your last visit? Hospitalized since your last visit? No    2. Have you seen or consulted any other health care providers outside of the [address] [place] [address] since your last visit? Include any pap smears or colon screening.  No     Visit Vitals  BP (!) 159/98 (BP 1 Location: Right arm)   Pulse 60   Temp 98 °F (36.7 °C) (Temporal)   Resp 20   Ht 5' 8\" (1.727 m)   Wt 182 lb 12.8 oz (82.9 kg)   SpO2 96%   BMI 27.79 kg/m²       Chief Complaint   Patient presents with    Follow-up     varicose veins

## 2022-05-01 NOTE — PROGRESS NOTES
Vascular History and Physical    Patient: Jermaine Galvan  MRN: [id number]    YOB: 1955  Age: 69 y.o. Sex: female     Chief Complaint:  Chief Complaint   Patient presents with    Follow-up     varicose veins       History of Present Illness: Jermaine Galvan is a 69 y.o. very pleasant woman complaining of the varicose vein and complication. Patient mostly has a varicose vein involving right leg with phlebitis as well. Patient's leg swelling and varicose vein associated with pain and achiness has not improved with conservative management. Patient does have  compression stocking. Patient does not have a recent venous duplex ultrasound. Patient denies chest pain shortness of breath. Patient denies any history of deep vein thrombosis. Social History:     Social Connections:     Frequency of Communication with Friends and Family: Not on file    Frequency of Social Gatherings with Friends and Family: Not on file    Attends Temple Services: Not on file    Active Member of Clubs or Organizations: Not on file    Attends Club or Organization Meetings: Not on file    Marital Status: Not on file       Past Medical History:  Past Medical History:   Diagnosis Date    Cancer [facility]) 1999    thyroid    Osteopenia     Thyroid disease     Varicose vein of leg        Surgical History:  Past Surgical History:   Procedure Laterality Date    HX CATARACT REMOVAL  2007    HX COLONOSCOPY      HX THYROIDECTOMY  1999       Allergies:  No Known Allergies    Current Meds:  Current Outpatient Medications   Medication Sig Dispense Refill    cholecalciferol (VITAMIN D3) (5000 Units/125 mcg) tab tablet 1 tablet      calcium carbonate (CALTREX) 600 mg calcium (1,500 mg) tablet Take 600 mg by mouth daily.       LevoxyL 100 mcg tablet TAKE 1 TABLET BY MOUTH ONCE DAILY IN THE MORNING ON AN EMPTY STOMACH FOR 90 DAYS         Review of Systems:  I reviewed the rest of organ systems personally and they are negative. Pertinent findings discussed in HPI. Physical Examination    Visit Vitals  BP (!) 159/98 (BP 1 Location: Right arm)   Pulse 60   Temp 98 °F (36.7 °C) (Temporal)   Resp 20   Ht 5' 8\" (1.727 m)   Wt 182 lb 12.8 oz (82.9 kg)   SpO2 96%   BMI 27.79 kg/m²     Gen: Well developed, well nourished 69 y.o. female in no acute distress  Head: normocephalic, atraumatic  Mouth: Clear, no overt lesions, oral mucosa pink and moist  Neck: supple, no masses, no adenopathy or carotid bruits, trachea midline  Resp: clear to auscultation bilaterally, no wheeze, rhonchi or rales, excursions normal and symmetrical  Cardio: Regular rate and rhythm, no murmurs, clicks, gallops or rubs, no edema or varicosities  Abdomen: soft, nontender, nondistended, normoactive bowel sounds, no hernias, no hepatosplenomegaly   Neuro: sensation and strength grossly intact and symmetrical  Psych: alert and oriented to person, place and time  Vascular examination: Patient's right leg is examined. Swelling is minimal to mild from below knee level to mid thigh level. Varicosities noted thigh, popliteal fossa and calf area. Patient has a CEAP C3 venous disorder. Skin: warm and moist.    Labs:  No visits with results within 1 Month(s) from this visit. Latest known visit with results is:   Abstract on 04/02/2010   Component Date Value Ref Range Status    Mammography, External 02/01/2010 NORMAL   Final         Images:      Donna Lynn MD    Assessments:  Patient Active Problem List   Diagnosis Code    Osteopenia M85.80    Menopause Z78.0    Phlebitis and thombophlb of superfic vessels of r low extrem I80.01    Chronic venous hypertension I87.309    Varicose vein of leg I83.90       Plans: We discussed conservative management continuously with the appropriate compression stocking with current pressure gradient. We also talked about symptomatic varicose vein treatment including microphlebectomy for local treatments.   After discussion patient has requested preauthorization from insurance company if she is approved she may consider microphlebectomy to treat her symptoms. We will work on that and I will let patient know. Otherwise follow-up in 3 months. Advised to continue management phlebitis with a warm compress therapy as well. Ibuprofen therapy.           Stephane Quiroga MD

## 2022-10-31 ENCOUNTER — TRANSCRIBE ORDER (OUTPATIENT)
Dept: SCHEDULING | Age: 67
End: 2022-10-31

## 2022-10-31 DIAGNOSIS — Z12.39 ENCOUNTER FOR SCREENING FOR MALIGNANT NEOPLASM OF BREAST: Primary | ICD-10-CM

## 2022-10-31 DIAGNOSIS — Z12.31 ENCOUNTER FOR SCREENING MAMMOGRAM FOR BREAST CANCER: ICD-10-CM

## 2022-12-19 ENCOUNTER — HOSPITAL ENCOUNTER (OUTPATIENT)
Dept: MAMMOGRAPHY | Age: 67
Discharge: HOME | End: 2022-12-19
Attending: FAMILY MEDICINE
Payer: MEDICARE

## 2022-12-19 DIAGNOSIS — Z12.31 ENCOUNTER FOR SCREENING MAMMOGRAM FOR BREAST CANCER: ICD-10-CM

## 2022-12-19 DIAGNOSIS — Z12.39 ENCOUNTER FOR SCREENING FOR MALIGNANT NEOPLASM OF BREAST: ICD-10-CM

## 2022-12-19 PROCEDURE — 77063 BREAST TOMOSYNTHESIS BI: CPT

## 2023-11-28 ENCOUNTER — TRANSCRIBE ORDERS (OUTPATIENT)
Facility: HOSPITAL | Age: 68
End: 2023-11-28

## 2023-11-28 DIAGNOSIS — Z12.31 ENCOUNTER FOR SCREENING MAMMOGRAM FOR MALIGNANT NEOPLASM OF BREAST: Primary | ICD-10-CM

## 2023-12-21 ENCOUNTER — HOSPITAL ENCOUNTER (OUTPATIENT)
Facility: HOSPITAL | Age: 68
Discharge: HOME | End: 2023-12-24
Payer: MEDICARE

## 2023-12-21 DIAGNOSIS — Z12.31 ENCOUNTER FOR SCREENING MAMMOGRAM FOR MALIGNANT NEOPLASM OF BREAST: ICD-10-CM

## 2023-12-21 PROCEDURE — 77063 BREAST TOMOSYNTHESIS BI: CPT

## 2024-12-30 ENCOUNTER — HOSPITAL ENCOUNTER (OUTPATIENT)
Facility: HOSPITAL | Age: 69
Discharge: HOME | End: 2025-01-02
Payer: MEDICARE

## 2024-12-30 DIAGNOSIS — Z12.31 ENCOUNTER FOR SCREENING MAMMOGRAM FOR MALIGNANT NEOPLASM OF BREAST: ICD-10-CM

## 2024-12-30 PROCEDURE — 77063 BREAST TOMOSYNTHESIS BI: CPT

## 2025-03-12 ENCOUNTER — HOSPITAL ENCOUNTER (OUTPATIENT)
Facility: HOSPITAL | Age: 70
Discharge: HOME | End: 2025-03-15
Payer: MEDICARE

## 2025-03-12 DIAGNOSIS — R74.8 ELEVATED LIVER ENZYMES: ICD-10-CM

## 2025-03-12 PROCEDURE — 76705 ECHO EXAM OF ABDOMEN: CPT

## 2025-08-26 ENCOUNTER — TRANSCRIBE ORDERS (OUTPATIENT)
Facility: HOSPITAL | Age: 70
End: 2025-08-26

## 2025-08-26 DIAGNOSIS — M81.0 AGE-RELATED OSTEOPOROSIS WITHOUT CURRENT PATHOLOGICAL FRACTURE: Primary | ICD-10-CM

## 2025-09-02 ENCOUNTER — HOSPITAL ENCOUNTER (OUTPATIENT)
Facility: HOSPITAL | Age: 70
Discharge: HOME | End: 2025-09-05
Payer: MEDICARE

## 2025-09-02 DIAGNOSIS — M81.0 AGE-RELATED OSTEOPOROSIS WITHOUT CURRENT PATHOLOGICAL FRACTURE: ICD-10-CM

## 2025-09-02 PROCEDURE — 77080 DXA BONE DENSITY AXIAL: CPT
